# Patient Record
Sex: FEMALE
[De-identification: names, ages, dates, MRNs, and addresses within clinical notes are randomized per-mention and may not be internally consistent; named-entity substitution may affect disease eponyms.]

---

## 2023-01-26 ENCOUNTER — P2P PATIENT RECORD (OUTPATIENT)
Age: 38
End: 2023-01-26

## 2023-05-10 ENCOUNTER — OFFICE VISIT (OUTPATIENT)
Dept: URBAN - METROPOLITAN AREA CLINIC 42 | Facility: CLINIC | Age: 38
End: 2023-05-10
Payer: COMMERCIAL

## 2023-05-10 ENCOUNTER — DASHBOARD ENCOUNTERS (OUTPATIENT)
Age: 38
End: 2023-05-10

## 2023-05-10 ENCOUNTER — WEB ENCOUNTER (OUTPATIENT)
Dept: URBAN - METROPOLITAN AREA CLINIC 42 | Facility: CLINIC | Age: 38
End: 2023-05-10

## 2023-05-10 VITALS
WEIGHT: 293 LBS | TEMPERATURE: 98.6 F | BODY MASS INDEX: 50.02 KG/M2 | RESPIRATION RATE: 20 BRPM | DIASTOLIC BLOOD PRESSURE: 81 MMHG | HEIGHT: 64 IN | HEART RATE: 91 BPM | SYSTOLIC BLOOD PRESSURE: 133 MMHG

## 2023-05-10 DIAGNOSIS — R10.13 EPIGASTRIC DISCOMFORT: ICD-10-CM

## 2023-05-10 PROCEDURE — 99203 OFFICE O/P NEW LOW 30 MIN: CPT | Performed by: INTERNAL MEDICINE

## 2023-05-10 NOTE — HPI-TODAY'S VISIT:
The patient is a 39 yo female s/p lap band many years ago complaining of very nonspecific complaints of epigastric discomfort, weight gain, and bumpy rash.  No real specific GI symptoms and has been tolerating diet and having regular BMs.  No family hx of any GI disease/malignancies.